# Patient Record
Sex: FEMALE | Race: BLACK OR AFRICAN AMERICAN | NOT HISPANIC OR LATINO | Employment: OTHER | URBAN - METROPOLITAN AREA
[De-identification: names, ages, dates, MRNs, and addresses within clinical notes are randomized per-mention and may not be internally consistent; named-entity substitution may affect disease eponyms.]

---

## 2021-01-06 PROBLEM — Z11.59 NEED FOR HEPATITIS C SCREENING TEST: Status: ACTIVE | Noted: 2021-01-06

## 2021-01-06 PROBLEM — Z11.4 ENCOUNTER FOR SCREENING FOR HIV: Status: ACTIVE | Noted: 2021-01-06

## 2021-01-06 PROBLEM — G35 MULTIPLE SCLEROSIS: Status: ACTIVE | Noted: 2021-01-06

## 2021-01-06 PROBLEM — Z12.11 SCREENING FOR COLON CANCER: Status: ACTIVE | Noted: 2021-01-06

## 2021-01-06 PROBLEM — I10 ESSENTIAL HYPERTENSION: Status: ACTIVE | Noted: 2021-01-06

## 2021-03-17 PROBLEM — E53.8 VITAMIN B12 DEFICIENCY: Status: ACTIVE | Noted: 2021-03-17

## 2021-03-17 PROBLEM — E55.9 VITAMIN D DEFICIENCY: Status: ACTIVE | Noted: 2021-03-17

## 2021-03-17 PROBLEM — E07.9 THYROID DYSFUNCTION: Status: ACTIVE | Noted: 2021-03-17

## 2022-06-14 PROBLEM — N39.46 MIXED INCONTINENCE: Status: ACTIVE | Noted: 2022-06-14

## 2022-06-14 PROBLEM — K21.9 GASTROESOPHAGEAL REFLUX DISEASE: Status: ACTIVE | Noted: 2022-06-14

## 2023-07-20 ENCOUNTER — TELEPHONE (OUTPATIENT)
Dept: PHARMACY | Facility: CLINIC | Age: 58
End: 2023-07-20

## 2023-07-20 NOTE — TELEPHONE ENCOUNTER
Hari, this is Lindsay Richard, clinical pharmacist with [facility] that is part of your care team.  We have begun working on your prescription that your doctor has sent us. Our next steps include:     Working with your insurance company to obtain approval for your medication  Working with you to ensure your medication is affordable     We will be calling you along the way with updates on your medication but if you have any concerns or receive information that you would like to discuss please reach us at [phone].    Welcome call outcome: Left voicemail    Claim shows Tysabri filled on 7/17 - need to confirm with pt if received Tysabri recently

## 2023-07-26 ENCOUNTER — SPECIALTY PHARMACY (OUTPATIENT)
Dept: PHARMACY | Facility: CLINIC | Age: 58
End: 2023-07-26

## 2023-07-26 DIAGNOSIS — G35 MULTIPLE SCLEROSIS: Primary | ICD-10-CM

## 2023-07-26 NOTE — TELEPHONE ENCOUNTER
Specialty Pharmacy - Initial Clinical Assessment    Specialty Medication Orders Linked to Encounter      Flowsheet Row Most Recent Value   Medication #1 ofatumumab (KESIMPTA PEN) 20 mg/0.4 mL PnIj (Order#[id number], Rx#[id number])     Pt will complete loading doses on 7/28, 8/4, and 8/11. SKIP 8/18. Maintenance to start on 8/25. Calendar also provided    Also informed pt to obtain Hep B labs asap, NP have already ordered     Patient Diagnosis   G35 - Multiple sclerosis    Subjective    Abena Verdugo is a 58 y.o. female, who is followed by the specialty pharmacy service for management and education.    Recent Encounters       Date Type Provider Description    07/26/2023 Specialty Pharmacy Lindsay Richard, PharmD Initial Clinical Assessment            Current Outpatient Medications   Medication Sig    amLODIPine (NORVASC) 5 MG tablet TAKE ONE TABLET BY MOUTH DAILY    baclofen (LIORESAL) 20 MG tablet Take 1 tablet (20 mg total) by mouth 3 (three) times daily.    cyanocobalamin 500 MCG tablet Take 2 tablets (1,000 mcg total) by mouth once daily. (Patient not taking: Reported on 6/29/2023)    famotidine (PEPCID) 20 MG tablet TAKE ONE TABLET BY MOUTH TWICE DAILY FOR acid reflux    gabapentin (NEURONTIN) 100 MG capsule Take 2 capsules (200 mg total) by mouth 2 (two) times daily.    ibuprofen (ADVIL,MOTRIN) 800 MG tablet Take by mouth.    meloxicam (MOBIC) 7.5 MG tablet Take one tablet by mouth once daily    ofatumumab (KESIMPTA PEN) 20 mg/0.4 mL PnIj Inject 20 mg into the skin every 28 days.    ofatumumab (KESIMPTA PEN) 20 mg/0.4 mL PnIj Inject 0.4 mL (20 mg) into the skin once a week on Week 0, 1, and 2. SKIP Week 3. Followed by maintenance starting on Week 4    oxybutynin (DITROPAN-XL) 10 MG 24 hr tablet TAKE ONE TABLET BY MOUTH DAILY   Last reviewed on 7/26/2023  2:24 PM by Lindsay Richard, PharmD    Review of patient's allergies indicates:   Allergen Reactions    Penicillins Swelling    Chocolate flavor Hives    Kiwi  (actinidia chinensis) Hives    Tomato (solanum lycopersicum) Hives   Last reviewed on  7/20/2023 2:04 PM by Lindsay Richard    Drug Interactions    Drug interactions evaluated: yes  Clinically relevant drug interactions identified: no  Provided the patient with educational material regarding drug interactions: not applicable         Adverse Effects    *All other systems reviewed and are negative       Assessment Questions - Documented Responses      Flowsheet Row Most Recent Value   Assessment    Medication Reconciliation completed for patient Yes   During the past 4 weeks, has patient missed any activities due to condition or medication? No   During the past 4 weeks, did patient have any of the following urgent care visits? None   Goals of Therapy Status Discussed (new start)   Status of the patients ability to self-administer: Is Able   All education points have been covered with patient? Yes, supplemental printed education provided   Welcome packet contents reviewed and discussed with patient? Yes   Assesment completed? Yes   Plan Therapy being initiated   Do you need to open a clinical intervention (i-vent)? No   Do you want to schedule first shipment? Yes   Medication #1 Assessment Info    Patient status New medication, New to [facility]   Is this medication appropriate for the patient? Yes   Is this medication effective? Not yet started          Refill Questions - Documented Responses      Flowsheet Row Most Recent Value   Patient Availability and HIPAA Verification    Does patient want to proceed with activity? Yes   HIPAA/medical authority confirmed? Yes   Relationship to patient of person spoken to? Self   Refill Screening Questions    When does the patient need to receive the medication? 07/28/23   Refill Delivery Questions    How will the patient receive the medication? Mail   When does the patient need to receive the medication? 07/28/23   Shipping Address Home   Address in [facility] confirmed and updated  "if neccessary? Yes   Expected Copay ($) 0   Is the patient able to afford the medication copay? Yes   Payment Method zero copay   Days supply of Refill 28   Supplies needed? No supplies needed   Refill activity completed? Yes   Refill activity plan Refill scheduled   Shipment/Pickup Date: 07/27/23            Objective    She has a past medical history of MS (multiple sclerosis).    Tried/failed medications: Shannan Marsh    BP Readings from Last 4 Encounters:   06/29/23 116/68   05/23/23 114/67   03/31/23 114/67   12/08/22 133/79     Ht Readings from Last 4 Encounters:   03/31/23 5' 6" (1.676 m)   12/08/22 5' 6" (1.676 m)   08/23/22 5' 6" (1.676 m)   08/19/22 5' 6" (1.676 m)     Wt Readings from Last 4 Encounters:   06/29/23 61.8 kg (136 lb 3.2 oz)   08/23/22 65.8 kg (145 lb)   08/19/22 63 kg (139 lb)   06/14/22 65.3 kg (144 lb)       The goals of Multiple Sclerosis treatment include:  Reducing frequency and severity of exacerbations  Managing symptoms to improve or maintain physical and psychological functioning and optimal well-being  Preventing or postponing long-term disability to maintain independence  Improving or maintaining quality of life  Maintaining optimal therapy adherence  Minimizing and managing comorbidities and side effects    Goals of Therapy Status: Discussed (new start)    Assessment/Plan  Patient plans to start therapy on 07/28/23      Indication, dosage, appropriateness, effectiveness, safety and convenience of her specialty medication(s) were reviewed today.     Patient Education   Patient received education on the following:   Expectations and possible outcomes of therapy  Proper use, timely administration, and missed dose management  Duration of therapy  Side effects, including prevention, minimization, and management  Contraindications and safety precautions  New or changed medications, including prescribe and over the counter medications and supplements  Reviews recommended vaccinations, " as appropriate  Storage, safe handling, and disposal    Pt will complete loading doses on 7/28, 8/4, and 8/11. SKIP 8/18. Maintenance to start on 8/25. Calendar also provided    Tasks added this encounter   No tasks added.   Tasks due within next 3 months   7/24/2023 - Benefits Investigation  7/26/2023 - Initial Clinical Assessment/Patient Education (Annual Reassessment)  7/26/2023 - Set up Initial Fill     Lindsay Richard, PharmD  [facility]  [address] [place] [id number]  Phone: [phone]  Fax: [phone]

## 2023-08-21 ENCOUNTER — SPECIALTY PHARMACY (OUTPATIENT)
Dept: PHARMACY | Facility: CLINIC | Age: 58
End: 2023-08-21

## 2023-08-24 NOTE — TELEPHONE ENCOUNTER
Incoming call from patient to confirm shipping of Kesimpta. Confirmed delivery for medication for 8/24. Confirmed address and  provided tracking number. No further questions

## 2023-09-21 PROBLEM — D70.8 OTHER NEUTROPENIA: Status: ACTIVE | Noted: 2023-09-21
